# Patient Record
Sex: FEMALE | Race: WHITE | NOT HISPANIC OR LATINO | Employment: UNEMPLOYED | URBAN - METROPOLITAN AREA
[De-identification: names, ages, dates, MRNs, and addresses within clinical notes are randomized per-mention and may not be internally consistent; named-entity substitution may affect disease eponyms.]

---

## 2024-04-08 ENCOUNTER — APPOINTMENT (OUTPATIENT)
Dept: AUDIOLOGY | Facility: CLINIC | Age: 44
End: 2024-04-08

## 2024-04-08 ENCOUNTER — APPOINTMENT (OUTPATIENT)
Dept: OTOLARYNGOLOGY | Facility: CLINIC | Age: 44
End: 2024-04-08

## 2024-05-09 ENCOUNTER — APPOINTMENT (OUTPATIENT)
Dept: AUDIOLOGY | Facility: CLINIC | Age: 44
End: 2024-05-09

## 2024-05-09 ENCOUNTER — APPOINTMENT (OUTPATIENT)
Dept: OTOLARYNGOLOGY | Facility: CLINIC | Age: 44
End: 2024-05-09

## 2024-06-25 ENCOUNTER — APPOINTMENT (OUTPATIENT)
Dept: OTOLARYNGOLOGY | Facility: CLINIC | Age: 44
End: 2024-06-25

## 2024-06-25 ENCOUNTER — CLINICAL SUPPORT (OUTPATIENT)
Dept: AUDIOLOGY | Facility: CLINIC | Age: 44
End: 2024-06-25

## 2024-06-25 VITALS
HEIGHT: 64 IN | DIASTOLIC BLOOD PRESSURE: 98 MMHG | SYSTOLIC BLOOD PRESSURE: 141 MMHG | WEIGHT: 178 LBS | BODY MASS INDEX: 30.39 KG/M2 | TEMPERATURE: 97.3 F

## 2024-06-25 DIAGNOSIS — H92.02 OTALGIA OF LEFT EAR: Primary | ICD-10-CM

## 2024-06-25 DIAGNOSIS — H92.02 LEFT EAR PAIN: Primary | ICD-10-CM

## 2024-06-25 DIAGNOSIS — H93.8X2 EAR PRESSURE, LEFT: ICD-10-CM

## 2024-06-25 DIAGNOSIS — H61.22 IMPACTED CERUMEN OF LEFT EAR: ICD-10-CM

## 2024-06-25 PROCEDURE — 99204 OFFICE O/P NEW MOD 45 MIN: CPT | Performed by: OTOLARYNGOLOGY

## 2024-06-25 PROCEDURE — 92550 TYMPANOMETRY & REFLEX THRESH: CPT | Performed by: AUDIOLOGIST

## 2024-06-25 PROCEDURE — 92557 COMPREHENSIVE HEARING TEST: CPT | Performed by: AUDIOLOGIST

## 2024-06-25 PROCEDURE — 69210 REMOVE IMPACTED EAR WAX UNI: CPT | Performed by: OTOLARYNGOLOGY

## 2024-06-25 NOTE — PROGRESS NOTES
"Impression:  1. Left ear pain        2. Impacted cerumen of left ear             RECOMMENDATIONS/PLAN :  Using the operative microscope and alligator forcep with small suction, I was able to remove the firm piece of cerumen that was stuck against her left tympanic membrane.  Instantaneously she had less pressure.  Otherwise I reassured the patient that her hearing is within normal limits and her tympanic membrane's are moving normally.  She can otherwise follow-up with her family physician as needed.      **This electronic medical record note was created with the use of voice recognition software.  Despite proofreading, typographical or grammatical errors may be present that could affect meaning of content **    Subjective   Patient ID:     Elana Chiu is a 43 y.o. female who presents to the office today complaining of a fullness feeling with pressure in the left ear.  She did have a previous ear infection many months ago.  No recent fever chills or drainage from the ears.  No vertigo or neurologic complaints.  No problems in the right ear.    ROS:  A detailed 12 system review of systems is noted on the intake form has been reviewed with the patient with details noted in the HPI and scanned into the patient's medical record.    Objective     History reviewed. No pertinent past medical history.     History reviewed. No pertinent surgical history.     No Known Allergies     No current outpatient medications on file.     Tobacco Use: High Risk (6/25/2024)    Patient History     Smoking Tobacco Use: Never     Smokeless Tobacco Use: Current     Passive Exposure: Not on file        Alcohol Use: Not on file        Social History     Substance and Sexual Activity   Drug Use Not on file        Physical Exam:  Visit Vitals  BP (!) 141/98   Temp 36.3 °C (97.3 °F) (Temporal)   Ht 1.626 m (5' 4\")   Wt 80.7 kg (178 lb)   BMI 30.55 kg/m²   Smoking Status Never   BSA 1.91 m²      General: Patient is alert, oriented, cooperative " in no apparent distress.  Head: Normocephalic, atraumatic.  Eyes: PERRL, EOMI, Conjunctiva is clear. No nystagmus.  Ears: Right Ear-- Pinna is normal.  External auditory canal is patent. Tympanic membrane is [intact, translucent and has good mobility with my pneumatic otoscope. No effusion].  Mastoid is nontender.  Left ear-- Pinna is normal.  External auditory canal is patent however she does have a firm piece of cerumen stuck against her tympanic membrane.  Using the operative microscope and alligator forcep, I was able to remove this and she was feeling better.. Tympanic membrane is [intact, translucent and has good mobility with my pneumatic otoscope.  No effusion].  Mastoid is nontender.    Results:   I reviewed her recent audiogram and her hearing is within normal limits for both ears.  Both tympanic membranes have normal mobility on tympanometry.  Word recognition scores are 100% bilaterally.  Speech reception threshold is 10 dB in the right ear and 15 dB in the left ear.    Procedure:   After informed consent was obtained with the risks, benefits, complications, and alternatives explained to the patient / guardian, the patient was laid back in the ENT chair and the operative microscope was brought to the [left] ear.  A speculum was placed and using the operative microscope and/or curette/ suction, I was able to carefully remove the cerumen that was stuck against her tympanic membrane causing pressure.   After doing so, the patient was feeling much better and had much less pain.  The TM was [intact, translucent and had good mobility with my pneumatic otoscope].  The patient tolerated the procedure well and there were no complications.      Alvarado Herzog, DO

## 2024-06-26 ASSESSMENT — PAIN SCALES - GENERAL: PAINLEVEL_OUTOF10: 0 - NO PAIN

## 2024-06-26 ASSESSMENT — PAIN - FUNCTIONAL ASSESSMENT: PAIN_FUNCTIONAL_ASSESSMENT: 0-10

## 2024-06-26 ASSESSMENT — ENCOUNTER SYMPTOMS: OCCASIONAL FEELINGS OF UNSTEADINESS: 0

## 2024-06-26 NOTE — PROGRESS NOTES
AUDIOLOGY ADULT AUDIOMETRIC EVALUATION      Name:  Elana Chiu  :  1980  Age:  43 y.o.  Date of Evaluation: 24    History:  Reason for visit:  Ms. Chiu was seen today as part of the visit with Alvarado Herzog D.O. for an evaluation of hearing.   Chief Complaint   Patient presents with    Earache    Ear Pressure     The patient stated for the past six months, she has noticed a sensation of left sided otalgia and ear pressure. Mentioned she has been treated for fluid in the left ear and has completed a course of antibiotics. She has felt the medication has improved her pain, however, she continues to notice some left sided ear fullness. She has experienced some concerns for reduced hearing in the left ear, however, stated she has been able to hear and communicate well.   She reported left sided concerns for her sinuses as well as occasional left sided tonal tinnitus.   Denied any dizziness/vertigo, ear surgery, recent falls, significant noise exposure, sinus/throat concerns, ear drainage, or sudden hearing loss.    EVALUATION     See Audiogram    RESULTS:    Otoscopic Evaluation:   Right Ear: Otoscopy revealed a clear healthy canal and a healthy tympanic membrane was visualized.   Left Ear:  Otoscopy revealed a clear healthy canal and a healthy tympanic membrane was visualized.     Immittance:  Immittance Measures: 226 Hz   Right Ear: Tympanometric testing revealed a normal type A tympanogram with normal middle ear pressure and normal static compliance.  Left Ear: Tympanometric testing revealed a normal type A tympanogram with normal middle ear pressure and normal static compliance.    Ipsilateral acoustic reflexes were present at, 500-4,000 Hz, at expected sensation levels.  Ipsilateral acoustic reflexes were present at, 500-4,000 Hz, at expected sensation levels.    Test technique:  Pure Tone Audiometry via insert earphones    Reliability:   excellent    Pure Tone Audiometry:    Right Ear:  Audiometric testing indicated normal peripheral hearing sensitivity from 125-8,000 Hz.  Left Ear:   Audiometric testing indicated normal peripheral hearing sensitivity from 125-8,000 Hz.      Speech Audiometry:   Right Ear:  Speech Reception Threshold (SRT) was obtained at 10 dBHL                 Word Recognition scores were excellent (100%) in quiet when words were presented at 50 dBHL  Left Ear:  Speech Reception Threshold (SRT) was obtained at 15 dBHL                 Word Recognition scores were excellent (100%) in quiet when words were presented at 55 dBHL  Testing was performed with recorded NU-6 speech words in quiet. Speech thresholds were in good agreement with the pure tone averages in each ear.     IMPRESSIONS:  Today's test results are consistent with normal peripheral hearing sensitivity, bilaterally.  The patient was counseled with regard to the findings.    RECOMMENDATIONS:  * Continue medical follow up with Alvarado Herzog D.O.  * Retest as medically indicated, or sooner if a change in hearing sensitivity is noticed.   * Retest as medically indicated, or if future concerns for hearing sensitivity arise.   * Wear hearing protection while in the presence of loud sounds.   * Use effective communication strategies such as asking the speaker to gain attention prior to speaking, speaking in the same room, repeating words that were heard, etc.    PATIENT EDUCATION:   Discussed results and recommendations with the patient.  Questions were addressed and the patient was encouraged to contact our department should concerns arise.  The patient was seen from 2:00-2:30 pm.